# Patient Record
Sex: FEMALE | Race: WHITE | NOT HISPANIC OR LATINO
[De-identification: names, ages, dates, MRNs, and addresses within clinical notes are randomized per-mention and may not be internally consistent; named-entity substitution may affect disease eponyms.]

---

## 2018-06-21 PROBLEM — Z00.00 ENCOUNTER FOR PREVENTIVE HEALTH EXAMINATION: Status: ACTIVE | Noted: 2018-06-21

## 2018-06-28 ENCOUNTER — APPOINTMENT (OUTPATIENT)
Dept: UROLOGY | Facility: CLINIC | Age: 48
End: 2018-06-28

## 2021-05-27 ENCOUNTER — APPOINTMENT (OUTPATIENT)
Dept: UROLOGY | Facility: CLINIC | Age: 51
End: 2021-05-27
Payer: MEDICAID

## 2021-05-27 VITALS
HEART RATE: 72 BPM | SYSTOLIC BLOOD PRESSURE: 104 MMHG | BODY MASS INDEX: 20.38 KG/M2 | WEIGHT: 115 LBS | DIASTOLIC BLOOD PRESSURE: 66 MMHG | TEMPERATURE: 97.5 F | OXYGEN SATURATION: 99 % | HEIGHT: 63 IN

## 2021-05-27 DIAGNOSIS — Z86.32 PERSONAL HISTORY OF GESTATIONAL DIABETES: ICD-10-CM

## 2021-05-27 DIAGNOSIS — Z78.9 OTHER SPECIFIED HEALTH STATUS: ICD-10-CM

## 2021-05-27 DIAGNOSIS — T74.21XA ADULT SEXUAL ABUSE, CONFIRMED, INITIAL ENCOUNTER: ICD-10-CM

## 2021-05-27 PROCEDURE — 99204 OFFICE O/P NEW MOD 45 MIN: CPT

## 2021-05-31 PROBLEM — T74.21XA: Status: ACTIVE | Noted: 2021-05-31
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
## 2021-05-31 NOTE — PHYSICAL EXAM
[General Appearance - Well Nourished] : well nourished [General Appearance - Well Developed] : well developed [Normal Appearance] : normal appearance [Well Groomed] : well groomed [Edema] : no peripheral edema [Respiration, Rhythm And Depth] : normal respiratory rhythm and effort [Exaggerated Use Of Accessory Muscles For Inspiration] : no accessory muscle use [FreeTextEntry1] : refused [Normal Station and Gait] : the gait and station were normal for the patient's age [] : no rash [No Focal Deficits] : no focal deficits [Oriented To Time, Place, And Person] : oriented to person, place, and time [Mood] : the mood was normal [No Palpable Adenopathy] : no palpable adenopathy
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
## 2021-05-31 NOTE — PHYSICAL EXAM
[General Appearance - Well Developed] : well developed [General Appearance - Well Nourished] : well nourished [Normal Appearance] : normal appearance [Well Groomed] : well groomed [Edema] : no peripheral edema [Exaggerated Use Of Accessory Muscles For Inspiration] : no accessory muscle use [Respiration, Rhythm And Depth] : normal respiratory rhythm and effort [FreeTextEntry1] : refused [Normal Station and Gait] : the gait and station were normal for the patient's age [] : no rash [No Focal Deficits] : no focal deficits [Oriented To Time, Place, And Person] : oriented to person, place, and time [Mood] : the mood was normal [No Palpable Adenopathy] : no palpable adenopathy

## 2021-05-31 NOTE — REVIEW OF SYSTEMS
[Recent Weight Gain (___ Lbs)] : recent [unfilled] ~Ulb weight gain [Chest Pain] : chest pain [Palpitations] : palpitations [Painful [place]] : painful [place] [denies] : denies pain with orgasm [Loss of interest] : loss of interest in sexual activity [Genital yeast infection] : genital yeast infection [Date of last menstrual period ____] : date of last menstrual period: [unfilled] [Abnormal menstrual period, if yes explain ___] : abnormal menstrual period [unfilled] [Presently in menopause ___] : presently in menopause [unfilled] [Urine Infection (bladder/kidney)] : bladder/kidney infection [Pain during urination] : pain during urination [Discharge from urine canal] : discharge from urine canal [Urine retention] : urine retention [Wake up at night to urinate  How many times?  ___] : wakes up to urinate [unfilled] times during the night [Bladder pressure] : experiences bladder pressure [Bladder fullness after urinating] : bladder fullness after urinating [Leakage of urine with urgency] : leakage of urine with urgency [Leakage of urine with straining, coughing, laughing] : leakage of urine with straining, coughing, laughing [Skin Lesions] : skin lesion [Easy Bleeding] : a tendency for easy bleeding [Easy Bruising] : a tendency for easy bruising [Swollen Glands] : swollen glands [Feeling Poorly] : feeling poorly [Feeling Tired] : feeling tired [Eyesight Problems] : eyesight problems [Constipation] : constipation [see HPI] : see HPI [Joint Pain] : joint pain [Joint Swelling] : joint swelling [Joint Stiffness] : joint stiffness [Limb Weakness] : limb weakness [Anxiety] : anxiety [Difficulty Walking] : difficulty walking [Depression] : depression [Hot Flashes] : hot flashes [Muscle Weakness] : muscle weakness [Negative] : Heme/Lymph

## 2021-05-31 NOTE — REVIEW OF SYSTEMS
[Recent Weight Gain (___ Lbs)] : recent [unfilled] ~Ulb weight gain [Chest Pain] : chest pain [Palpitations] : palpitations [Painful [place]] : painful [place] [denies] : denies pain with orgasm [Loss of interest] : loss of interest in sexual activity [Genital yeast infection] : genital yeast infection [Date of last menstrual period ____] : date of last menstrual period: [unfilled] [Abnormal menstrual period, if yes explain ___] : abnormal menstrual period [unfilled] [Presently in menopause ___] : presently in menopause [unfilled] [Urine Infection (bladder/kidney)] : bladder/kidney infection [Pain during urination] : pain during urination [Discharge from urine canal] : discharge from urine canal [Urine retention] : urine retention [Wake up at night to urinate  How many times?  ___] : wakes up to urinate [unfilled] times during the night [Bladder pressure] : experiences bladder pressure [Bladder fullness after urinating] : bladder fullness after urinating [Leakage of urine with urgency] : leakage of urine with urgency [Leakage of urine with straining, coughing, laughing] : leakage of urine with straining, coughing, laughing [Skin Lesions] : skin lesion [Easy Bleeding] : a tendency for easy bleeding [Easy Bruising] : a tendency for easy bruising [Swollen Glands] : swollen glands [Feeling Poorly] : feeling poorly [Feeling Tired] : feeling tired [Eyesight Problems] : eyesight problems [Constipation] : constipation [see HPI] : see HPI [Joint Pain] : joint pain [Joint Swelling] : joint swelling [Joint Stiffness] : joint stiffness [Limb Weakness] : limb weakness [Difficulty Walking] : difficulty walking [Anxiety] : anxiety [Depression] : depression [Hot Flashes] : hot flashes [Muscle Weakness] : muscle weakness [Negative] : Heme/Lymph
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
## 2021-07-11 DIAGNOSIS — N39.0 URINARY TRACT INFECTION, SITE NOT SPECIFIED: ICD-10-CM

## 2021-07-12 ENCOUNTER — APPOINTMENT (OUTPATIENT)
Dept: UROGYNECOLOGY | Facility: CLINIC | Age: 51
End: 2021-07-12
Payer: MEDICAID

## 2021-07-12 VITALS
HEIGHT: 63 IN | WEIGHT: 114 LBS | DIASTOLIC BLOOD PRESSURE: 76 MMHG | OXYGEN SATURATION: 98 % | BODY MASS INDEX: 20.2 KG/M2 | HEART RATE: 70 BPM | SYSTOLIC BLOOD PRESSURE: 115 MMHG

## 2021-07-12 DIAGNOSIS — Z83.49 FAMILY HISTORY OF OTHER ENDOCRINE, NUTRITIONAL AND METABOLIC DISEASES: ICD-10-CM

## 2021-07-12 DIAGNOSIS — Z86.59 PERSONAL HISTORY OF OTHER MENTAL AND BEHAVIORAL DISORDERS: ICD-10-CM

## 2021-07-12 DIAGNOSIS — Z87.828 PERSONAL HISTORY OF OTHER (HEALED) PHYSICAL INJURY AND TRAUMA: ICD-10-CM

## 2021-07-12 DIAGNOSIS — Z80.3 FAMILY HISTORY OF MALIGNANT NEOPLASM OF BREAST: ICD-10-CM

## 2021-07-12 DIAGNOSIS — Z82.49 FAMILY HISTORY OF ISCHEMIC HEART DISEASE AND OTHER DISEASES OF THE CIRCULATORY SYSTEM: ICD-10-CM

## 2021-07-12 PROCEDURE — 51798 US URINE CAPACITY MEASURE: CPT

## 2021-07-12 PROCEDURE — 99204 OFFICE O/P NEW MOD 45 MIN: CPT

## 2021-07-13 PROBLEM — Z87.828 HISTORY OF HEAD INJURY: Status: RESOLVED | Noted: 2021-07-13 | Resolved: 2021-07-13

## 2021-07-13 PROBLEM — Z82.49 FAMILY HISTORY OF HYPERTENSION: Status: ACTIVE | Noted: 2021-07-13

## 2021-07-13 PROBLEM — Z86.59 HISTORY OF DEPRESSION: Status: RESOLVED | Noted: 2021-07-13 | Resolved: 2021-07-13

## 2021-07-13 PROBLEM — Z87.828 HISTORY OF BACK INJURY: Status: RESOLVED | Noted: 2021-07-13 | Resolved: 2021-07-13

## 2021-07-13 PROBLEM — Z80.3 FAMILY HISTORY OF MALIGNANT NEOPLASM OF BREAST: Status: ACTIVE | Noted: 2021-05-27

## 2021-07-13 PROBLEM — Z83.49 FAMILY HISTORY OF THYROID DISEASE: Status: ACTIVE | Noted: 2021-07-13

## 2021-07-14 ENCOUNTER — NON-APPOINTMENT (OUTPATIENT)
Age: 51
End: 2021-07-14

## 2021-07-14 LAB — BACTERIA UR CULT: NORMAL

## 2021-07-19 ENCOUNTER — APPOINTMENT (OUTPATIENT)
Dept: UROGYNECOLOGY | Facility: CLINIC | Age: 51
End: 2021-07-19
Payer: MEDICAID

## 2021-07-19 PROCEDURE — 57160 INSERT PESSARY/OTHER DEVICE: CPT
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
## 2021-07-19 NOTE — PHYSICAL EXAM
[No Acute Distress] : in no acute distress [Well developed] : well developed [Well Nourished] : ~L well nourished [Good Hygeine] : demonstrates good hygeine [Oriented x3] : oriented to person, place, and time [Normal Memory] : ~T memory was ~L unimpaired [Normal Lung Sounds] : the lungs were clear to auscultation [Respirations regular] : ~T respiratory rate was regular [Rate & Rhythm Regular] : ~T heart rate and rhythm were normal [No Edema] : ~T edema was not present [Supple] : ~T the neck demonstrated no ~M decrease in suppleness [Thyroid Normal] : the thyroid ~T showed no abnormalities [Symmetrical] : the neck was ~L symmetrical [Normal Gait] : gait was normal [Labia Majora] : were normal [Labia Minora] : were normal [Bartholin's Gland] : both Bartholin's glands were normal  [Normal Appearance] : general appearance was normal [Pink Rugae] : pink rugae [Estrogen Effect] : estrogen effect was observed [No Bleeding] : there was no active vaginal bleeding [Aa ____] : Aa [unfilled] [Ba ____] : Ba [unfilled] [C ____] : C [unfilled] [GH ____] : GH [unfilled] [PB ____] : PB [unfilled] [TVL ____] : TVL  [unfilled] [Ap ____] : Ap [unfilled] [Bp ____] : Bp [unfilled] [D ____] : D [unfilled] [] : II [Enlarge ___wks] : enlarged in [unfilled] ~Uweeks [Normal] : no abnormalities [Post Void Residual ____ml] : post void residual was [unfilled] ml [Exam Deferred] : was deferred [Normal Mood/Affect] : mood and/or affect are not normal [Cough] : no cough [Dyspnea] : no dyspnea [Murmurs] : no murmurs were heard [Varicose Veins] : no varicose veins observed [Tracheal Deviation] : no tracheal deviation observed [Mass] : no ~M [unfilled] neck mass was observed [Mass (___ Cm)] : no ~M [unfilled] abdominal mass was palpated [Tenderness] : ~T no ~M abdominal tenderness observed [Distended] : not distended [H/Smegaly] : no hepatosplenomegaly [Hernia] : no hernia observed [Scar] : no scars [FreeTextEntry3] : ANTHONY with valsalva [de-identified] : ?fibroid uterus
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
## 2021-07-22 ENCOUNTER — APPOINTMENT (OUTPATIENT)
Dept: UROGYNECOLOGY | Facility: CLINIC | Age: 51
End: 2021-07-22
Payer: MEDICAID

## 2021-07-22 PROCEDURE — 51741 ELECTRO-UROFLOWMETRY FIRST: CPT

## 2021-07-22 PROCEDURE — 51729 CYSTOMETROGRAM W/VP&UP: CPT

## 2021-07-22 PROCEDURE — 51797 INTRAABDOMINAL PRESSURE TEST: CPT

## 2021-07-22 PROCEDURE — 51798 US URINE CAPACITY MEASURE: CPT

## 2021-07-22 PROCEDURE — 51784 ANAL/URINARY MUSCLE STUDY: CPT

## 2021-07-24 PROBLEM — N39.3 FEMALE STRESS INCONTINENCE: Status: ACTIVE | Noted: 2021-05-31

## 2021-07-24 PROBLEM — N81.10 VAGINAL PROLAPSE: Status: ACTIVE | Noted: 2021-07-12

## 2021-07-28 ENCOUNTER — APPOINTMENT (OUTPATIENT)
Dept: UROGYNECOLOGY | Facility: CLINIC | Age: 51
End: 2021-07-28
Payer: MEDICAID

## 2021-07-28 DIAGNOSIS — N39.3 STRESS INCONTINENCE (FEMALE) (MALE): ICD-10-CM

## 2021-07-28 DIAGNOSIS — N81.10 CYSTOCELE, UNSPECIFIED: ICD-10-CM

## 2021-07-28 PROCEDURE — 99213 OFFICE O/P EST LOW 20 MIN: CPT

## 2021-07-28 NOTE — HISTORY OF PRESENT ILLNESS
[FreeTextEntry1] : The pt is here for pre-op visit for tx of ANTHONY and vaginal prolapse\par She is ready to proceed with surgical tx

## 2021-07-28 NOTE — ASSESSMENT
[FreeTextEntry1] : She is scheduled for TVT, and A&P repair.\par \par I discussed various treatment options including pelvic floor physical therapy, pessary and surgery.  She declined conservative therapy and opted for surgery.  We discussed various anti-incontinence procedures including Zamora urethropexy, autologous fascial sling and midurethral sling with mesh.  Risks and benefits and long term efficacy of each procedure were discussed and written pamphlets were provided.  She opted for TVT as this is considered to be the gold standard for treatment of stress urinary incontinence with long term efficacy of 85-90%.  Extensive counseling on the Position Statement on Mesh Midurethral Slings for ANTHONY by American Urogynecologic Society(AUGS), American Urologic Association (AUA), Society for Gynecologic Surgeons (SGS), American College of Obstetrics and Gynecology (ACOG), National Association of Continence (NAFC) took place.  The patient understood that synthetic mid-urethral sling is the most common surgery performed for treatment of  ANTHONY and extensive data exist to support the use of synthetic polypropylene mesh for treatment of ANTHONY with minimal morbidity compared to alternative surgeries.  Advantages include shorter operative time, reduced surgical pain, reduced hospitalization, and reduced voiding dyfunction.  Mesh-related conplication can occur following synthetic mesh placement, but the rate of these complications is acceptably low per ALL the above mentioned societies.  Furthermore, it is important to recognize that many of the sling related complications are NOT unique to mesh surgeries and are known to occur with non-mesh related procedures as well. Multiple RCT and case series attest to the efficacy at 5-10 yrs that is equivalent or superior to other techniques.  We discussed the risks of planned surgery; which include, and not limited to, bleeding, infection, damage to surrounding organs, persistent and recurrent ANTHONY, mesh exposure (2-5%) often excised in the office, She was also informed of de-briseida urge UI and that this is a treatment of ANTHONY and NOT urgency UI.  The patient expressed understanding of the nature of the surgery and consent was obtained.\par \par

## 2021-08-01 ENCOUNTER — TRANSCRIPTION ENCOUNTER (OUTPATIENT)
Age: 51
End: 2021-08-01

## 2021-08-01 LAB — BACTERIA UR CULT: NORMAL

## 2021-08-02 ENCOUNTER — OUTPATIENT (OUTPATIENT)
Dept: OUTPATIENT SERVICES | Facility: HOSPITAL | Age: 51
LOS: 1 days | Discharge: HOME | End: 2021-08-02

## 2021-08-02 ENCOUNTER — APPOINTMENT (OUTPATIENT)
Dept: UROGYNECOLOGY | Facility: HOSPITAL | Age: 51
End: 2021-08-02

## 2021-08-02 ENCOUNTER — APPOINTMENT (OUTPATIENT)
Dept: UROGYNECOLOGY | Facility: AMBULATORY SURGERY CENTER | Age: 51
End: 2021-08-02
Payer: MEDICAID

## 2021-08-02 DIAGNOSIS — G89.18 OTHER ACUTE POSTPROCEDURAL PAIN: ICD-10-CM

## 2021-08-02 PROCEDURE — 57288 REPAIR BLADDER DEFECT: CPT

## 2021-08-02 PROCEDURE — 57260 CMBN ANT PST COLPRHY: CPT

## 2021-08-02 PROCEDURE — 56620 VULVECTOMY SIMPLE PARTIAL: CPT

## 2021-08-02 RX ORDER — OXYCODONE AND ACETAMINOPHEN 5; 325 MG/1; MG/1
5-325 TABLET ORAL EVERY 6 HOURS
Qty: 5 | Refills: 0 | Status: ACTIVE | COMMUNITY
Start: 2021-08-02 | End: 1900-01-01

## 2021-08-03 ENCOUNTER — APPOINTMENT (OUTPATIENT)
Dept: UROGYNECOLOGY | Facility: CLINIC | Age: 51
End: 2021-08-03
Payer: MEDICAID

## 2021-08-03 PROCEDURE — 99024 POSTOP FOLLOW-UP VISIT: CPT

## 2021-08-04 ENCOUNTER — APPOINTMENT (OUTPATIENT)
Dept: UROGYNECOLOGY | Facility: CLINIC | Age: 51
End: 2021-08-04
Payer: MEDICAID

## 2021-08-04 DIAGNOSIS — R33.9 RETENTION OF URINE, UNSPECIFIED: ICD-10-CM

## 2021-08-04 PROCEDURE — 99024 POSTOP FOLLOW-UP VISIT: CPT

## 2021-08-04 NOTE — OBJECTIVE
[Voiding Trial] : Voiding trial was performed [FreeTextEntry3] : Unable to void or perform SIC, cathter replaced

## 2021-08-04 NOTE — OBJECTIVE
[Post Void Residual ____ ml] : Post Void Residual was [unfilled] ml [Soft and Nontender] : soft and nontender [Clean, Dry, Intact] : Clean, Dry, Intact [Voiding Trial] : No voiding trial was performed

## 2021-08-04 NOTE — SUBJECTIVE
[FreeTextEntry1] : Overall doing well. Ambulating well. Passing flatus. Report light vaginal bleeding.

## 2021-08-04 NOTE — ASSESSMENT
[FreeTextEntry1] : Failed trial of void.\par Patient taught to self catheterize. Successful teach back demonstrated.\par Patient to measure void and PVR.\par Urine sent for culture. \par Patient to RTO for 1 mesha post op visit or PRN

## 2021-08-05 ENCOUNTER — NON-APPOINTMENT (OUTPATIENT)
Age: 51
End: 2021-08-05

## 2021-08-05 LAB — BACTERIA UR CULT: NORMAL

## 2021-08-05 RX ORDER — NITROFURANTOIN MACROCRYSTALS 50 MG/1
50 CAPSULE ORAL DAILY
Qty: 10 | Refills: 0 | Status: ACTIVE | COMMUNITY
Start: 2021-08-05 | End: 1900-01-01

## 2021-08-07 ENCOUNTER — NON-APPOINTMENT (OUTPATIENT)
Age: 51
End: 2021-08-07

## 2021-08-07 LAB — BACTERIA UR CULT: NORMAL

## 2021-08-09 ENCOUNTER — NON-APPOINTMENT (OUTPATIENT)
Age: 51
End: 2021-08-09

## 2021-08-11 ENCOUNTER — APPOINTMENT (OUTPATIENT)
Dept: UROGYNECOLOGY | Facility: CLINIC | Age: 51
End: 2021-08-11
Payer: MEDICAID

## 2021-08-11 PROCEDURE — 99024 POSTOP FOLLOW-UP VISIT: CPT

## 2021-08-11 NOTE — ASSESSMENT
[FreeTextEntry1] : Passed trial of void\par Re educated on self cath if needed PRN\par Urine sent for culture\par RTO for 1 month post op appointment

## 2021-08-11 NOTE — SUBJECTIVE
[FreeTextEntry1] : Overall patient is is good spirits. Ambulating well. Reports minimal vaginal bleeding. Last bowel movement was 3 days ago. Passing flatus. Reports intermittent abdominal pain at night 4/10; takes Tylenol which provides relief.

## 2021-08-12 ENCOUNTER — APPOINTMENT (OUTPATIENT)
Dept: UROGYNECOLOGY | Facility: CLINIC | Age: 51
End: 2021-08-12
Payer: MEDICAID

## 2021-08-12 PROCEDURE — 99024 POSTOP FOLLOW-UP VISIT: CPT

## 2021-08-12 RX ORDER — SULFAMETHOXAZOLE AND TRIMETHOPRIM 800; 160 MG/1; MG/1
800-160 TABLET ORAL
Qty: 20 | Refills: 0 | Status: ACTIVE | COMMUNITY
Start: 2021-08-12 | End: 1900-01-01

## 2021-08-12 RX ORDER — NITROFURANTOIN (MONOHYDRATE/MACROCRYSTALS) 25; 75 MG/1; MG/1
100 CAPSULE ORAL TWICE DAILY
Qty: 14 | Refills: 0 | Status: DISCONTINUED | COMMUNITY
Start: 2021-08-12 | End: 2021-08-12

## 2021-08-13 NOTE — ASSESSMENT
[FreeTextEntry1] : Muse catheter replaced.\par Patient started on Bactrim. Urine culture is pending.\par RTO in 1 week for trial of void.

## 2021-08-13 NOTE — SUBJECTIVE
[FreeTextEntry1] : Patient voiding very little and having a hard time performing SIC. Reports light vaginal bleeding.

## 2021-08-16 ENCOUNTER — NON-APPOINTMENT (OUTPATIENT)
Age: 51
End: 2021-08-16

## 2021-08-16 LAB — BACTERIA UR CULT: NORMAL

## 2021-08-18 ENCOUNTER — APPOINTMENT (OUTPATIENT)
Dept: UROGYNECOLOGY | Facility: CLINIC | Age: 51
End: 2021-08-18
Payer: MEDICAID

## 2021-08-18 PROCEDURE — 99024 POSTOP FOLLOW-UP VISIT: CPT

## 2021-08-19 NOTE — SUBJECTIVE
[FreeTextEntry1] : Reports occassional vaginal spotting. Bleeding has decreased. Last BM 3 days ago. Ambulating well. Reports good appetite.

## 2021-08-19 NOTE — ASSESSMENT
[FreeTextEntry1] : Passed trial of void\par Encouraged daily Miralax and Milk of Magnesia as needed\par Urine sent for culture\par RTO for 1 month post op visit

## 2021-08-26 PROBLEM — B37.3 YEAST INFECTION OF THE VAGINA: Status: RESOLVED | Noted: 2021-08-26 | Resolved: 2021-09-25

## 2021-08-26 RX ORDER — FLUCONAZOLE 150 MG/1
150 TABLET ORAL
Qty: 1 | Refills: 0 | Status: ACTIVE | COMMUNITY
Start: 2021-08-26 | End: 1900-01-01

## 2021-08-30 ENCOUNTER — NON-APPOINTMENT (OUTPATIENT)
Age: 51
End: 2021-08-30

## 2021-08-30 RX ORDER — NITROFURANTOIN (MONOHYDRATE/MACROCRYSTALS) 25; 75 MG/1; MG/1
100 CAPSULE ORAL TWICE DAILY
Qty: 14 | Refills: 0 | Status: ACTIVE | COMMUNITY
Start: 2021-08-30 | End: 1900-01-01

## 2021-09-01 ENCOUNTER — APPOINTMENT (OUTPATIENT)
Dept: UROGYNECOLOGY | Facility: CLINIC | Age: 51
End: 2021-09-01
Payer: MEDICAID

## 2021-09-01 PROCEDURE — 99024 POSTOP FOLLOW-UP VISIT: CPT

## 2021-09-01 NOTE — OBJECTIVE
[Soft and Nontender] : soft and nontender [Clean, Dry, Intact] : Clean, Dry, Intact [Good Support] : Good support [Healing well] : healing well [No Masses or Tenderness] : no masses or tenderness [Post Void Residual ____ ml] : Post Void Residual was [unfilled] ml

## 2021-09-01 NOTE — ASSESSMENT
[FreeTextEntry1] : The pt has appropriate healing and is doing welll. She is pleased with the outcome.   Questions were answered and pathology report was reviewed. \par \par

## 2021-09-02 ENCOUNTER — APPOINTMENT (OUTPATIENT)
Dept: UROGYNECOLOGY | Facility: CLINIC | Age: 51
End: 2021-09-02
Payer: MEDICAID

## 2021-09-02 DIAGNOSIS — B37.3 CANDIDIASIS OF VULVA AND VAGINA: ICD-10-CM

## 2021-09-15 ENCOUNTER — APPOINTMENT (OUTPATIENT)
Dept: UROGYNECOLOGY | Facility: CLINIC | Age: 51
End: 2021-09-15
Payer: MEDICAID

## 2021-09-15 DIAGNOSIS — Z98.890 OTHER SPECIFIED POSTPROCEDURAL STATES: ICD-10-CM

## 2021-09-15 PROCEDURE — 99024 POSTOP FOLLOW-UP VISIT: CPT

## 2021-09-15 NOTE — OBJECTIVE
[Soft and Nontender] : soft and nontender [Clean, Dry, Intact] : Clean, Dry, Intact [Good Support] : Good support [Healing well] : healing well [No Masses or Tenderness] : no masses or tenderness

## 2021-09-15 NOTE — SUBJECTIVE
[FreeTextEntry1] : Pt still feels burning with urination although it has improved.  Otherwise, she is doing well

## 2021-09-18 ENCOUNTER — NON-APPOINTMENT (OUTPATIENT)
Age: 51
End: 2021-09-18

## 2021-09-18 LAB — BACTERIA UR CULT: NORMAL
